# Patient Record
Sex: FEMALE | Race: WHITE | NOT HISPANIC OR LATINO | URBAN - METROPOLITAN AREA
[De-identification: names, ages, dates, MRNs, and addresses within clinical notes are randomized per-mention and may not be internally consistent; named-entity substitution may affect disease eponyms.]

---

## 2022-08-25 ENCOUNTER — OFFICE VISIT (OUTPATIENT)
Dept: URBAN - METROPOLITAN AREA CLINIC 44 | Facility: CLINIC | Age: 87
End: 2022-08-25
Payer: MEDICARE

## 2022-08-25 DIAGNOSIS — H18.40 UNSPECIFIED CORNEAL DEGENERATION: Primary | ICD-10-CM

## 2022-08-25 DIAGNOSIS — H35.363 DRUSEN (DEGENERATIVE) OF MACULA, BILATERAL: ICD-10-CM

## 2022-08-25 PROCEDURE — 92134 CPTRZ OPH DX IMG PST SGM RTA: CPT | Performed by: OPTOMETRIST

## 2022-08-25 PROCEDURE — 92004 COMPRE OPH EXAM NEW PT 1/>: CPT | Performed by: OPTOMETRIST

## 2022-08-25 ASSESSMENT — INTRAOCULAR PRESSURE
OS: 14
OD: 14

## 2022-08-25 ASSESSMENT — VISUAL ACUITY
OS: 20/50
OD: 20/30

## 2022-08-25 ASSESSMENT — KERATOMETRY
OD: 42.25
OS: 42.00

## 2022-08-25 NOTE — IMPRESSION/PLAN
Impression: Drusen (degenerative) of macula, bilateral: H35.363. Plan: Discussed findings with patient, drusen fomation OU. Recommend patient take an [employer]'s formula multiple vitamin daily. Observation is all that is indicated at this time.

## 2022-08-25 NOTE — IMPRESSION/PLAN
Impression: Unspecified corneal degeneration: H18.40. Plan: EBMD OU, discussed w/ pt BVA limits. Start artificial tears PRN OU for comfort.

## 2023-08-29 ENCOUNTER — OFFICE VISIT (OUTPATIENT)
Dept: URBAN - METROPOLITAN AREA CLINIC 44 | Facility: CLINIC | Age: 88
End: 2023-08-29
Payer: MEDICARE

## 2023-08-29 DIAGNOSIS — H18.40 UNSPECIFIED CORNEAL DEGENERATION: ICD-10-CM

## 2023-08-29 DIAGNOSIS — H43.313 VITREOUS MEMBRANES AND STRANDS, BILATERAL: ICD-10-CM

## 2023-08-29 DIAGNOSIS — H35.363 DRUSEN (DEGENERATIVE) OF MACULA, BILATERAL: Primary | ICD-10-CM

## 2023-08-29 DIAGNOSIS — H04.123 DRY EYE SYNDROME OF BILATERAL LACRIMAL GLANDS: ICD-10-CM

## 2023-08-29 PROCEDURE — 92134 CPTRZ OPH DX IMG PST SGM RTA: CPT | Performed by: OPTOMETRIST

## 2023-08-29 PROCEDURE — 92014 COMPRE OPH EXAM EST PT 1/>: CPT | Performed by: OPTOMETRIST

## 2023-08-29 ASSESSMENT — INTRAOCULAR PRESSURE
OD: 11
OS: 11

## 2023-08-29 ASSESSMENT — VISUAL ACUITY
OS: 20/40
OD: 20/20

## 2023-08-29 ASSESSMENT — KERATOMETRY
OS: 42.50
OD: 42.50

## 2024-09-17 ENCOUNTER — OFFICE VISIT (OUTPATIENT)
Dept: URBAN - METROPOLITAN AREA CLINIC 44 | Facility: CLINIC | Age: 89
End: 2024-09-17
Payer: MEDICARE

## 2024-09-17 DIAGNOSIS — H04.123 TEAR FILM INSUFFICIENCY OF BILATERAL LACRIMAL GLANDS: Primary | ICD-10-CM

## 2024-09-17 DIAGNOSIS — H43.313 VITREOUS MEMBRANES AND STRANDS, BILATERAL: ICD-10-CM

## 2024-09-17 DIAGNOSIS — H52.4 PRESBYOPIA: ICD-10-CM

## 2024-09-17 DIAGNOSIS — H35.3132 NONEXUDATIVE MACULAR DEGENERATION, INTERMEDIATE DRY STAGE, BILATERAL: ICD-10-CM

## 2024-09-17 PROCEDURE — 92014 COMPRE OPH EXAM EST PT 1/>: CPT | Performed by: OPTOMETRIST

## 2024-09-17 PROCEDURE — 92134 CPTRZ OPH DX IMG PST SGM RTA: CPT | Performed by: OPTOMETRIST

## 2024-09-17 ASSESSMENT — VISUAL ACUITY
OD: 20/30
OS: 20/40

## 2024-09-17 ASSESSMENT — KERATOMETRY
OS: 42.25
OD: 42.13

## 2024-09-17 ASSESSMENT — INTRAOCULAR PRESSURE
OD: 16
OS: 16